# Patient Record
Sex: FEMALE | Race: OTHER | Employment: UNEMPLOYED | URBAN - METROPOLITAN AREA
[De-identification: names, ages, dates, MRNs, and addresses within clinical notes are randomized per-mention and may not be internally consistent; named-entity substitution may affect disease eponyms.]

---

## 2021-03-22 LAB
CHLAMYDIA, EXTERNAL: NEGATIVE
HBSAG, EXTERNAL: NEGATIVE
HIV, EXTERNAL: NEGATIVE
N. GONORRHEA, EXTERNAL: NEGATIVE
RPR, EXTERNAL: NON REACTIVE
RUBELLA, EXTERNAL: NORMAL
TYPE, ABO & RH, EXTERNAL: NORMAL

## 2021-09-17 LAB — GRBS, EXTERNAL: NEGATIVE

## 2021-10-04 ENCOUNTER — HOSPITAL ENCOUNTER (INPATIENT)
Age: 41
LOS: 3 days | Discharge: HOME | DRG: 560 | End: 2021-10-07
Attending: OBSTETRICS & GYNECOLOGY | Admitting: OBSTETRICS & GYNECOLOGY
Payer: MEDICAID

## 2021-10-04 PROBLEM — O24.419 GESTATIONAL DIABETES: Status: ACTIVE | Noted: 2021-10-04

## 2021-10-04 PROBLEM — Z33.1 IUP (INTRAUTERINE PREGNANCY), INCIDENTAL: Status: ACTIVE | Noted: 2021-10-04

## 2021-10-04 PROBLEM — D64.9 ANEMIA: Status: ACTIVE | Noted: 2021-10-04

## 2021-10-04 PROBLEM — O09.529 ADVANCED MATERNAL AGE IN MULTIGRAVIDA: Status: ACTIVE | Noted: 2021-10-04

## 2021-10-04 LAB
ABO + RH BLD: NORMAL
BASOPHILS # BLD: 0 K/UL (ref 0–0.1)
BASOPHILS NFR BLD: 0 % (ref 0–2)
BLOOD GROUP ANTIBODIES SERPL: NORMAL
DIFFERENTIAL METHOD BLD: ABNORMAL
EOSINOPHIL # BLD: 0 K/UL (ref 0–0.4)
EOSINOPHIL NFR BLD: 0 % (ref 0–5)
ERYTHROCYTE [DISTWIDTH] IN BLOOD BY AUTOMATED COUNT: 14.1 % (ref 11.6–14.5)
HCT VFR BLD AUTO: 30.8 % (ref 35–45)
HGB BLD-MCNC: 10.4 G/DL (ref 12–16)
LYMPHOCYTES # BLD: 1.7 K/UL (ref 0.9–3.6)
LYMPHOCYTES NFR BLD: 25 % (ref 21–52)
MCH RBC QN AUTO: 32.5 PG (ref 24–34)
MCHC RBC AUTO-ENTMCNC: 33.8 G/DL (ref 31–37)
MCV RBC AUTO: 96.3 FL (ref 78–100)
MONOCYTES # BLD: 0.5 K/UL (ref 0.05–1.2)
MONOCYTES NFR BLD: 7 % (ref 3–10)
NEUTS SEG # BLD: 4.3 K/UL (ref 1.8–8)
NEUTS SEG NFR BLD: 66 % (ref 40–73)
PLATELET # BLD AUTO: 231 K/UL (ref 135–420)
PMV BLD AUTO: 11.5 FL (ref 9.2–11.8)
RBC # BLD AUTO: 3.2 M/UL (ref 4.2–5.3)
SPECIMEN EXP DATE BLD: NORMAL
WBC # BLD AUTO: 6.5 K/UL (ref 4.6–13.2)

## 2021-10-04 PROCEDURE — 74011250636 HC RX REV CODE- 250/636: Performed by: ADVANCED PRACTICE MIDWIFE

## 2021-10-04 PROCEDURE — 65270000029 HC RM PRIVATE

## 2021-10-04 PROCEDURE — 85025 COMPLETE CBC W/AUTO DIFF WBC: CPT

## 2021-10-04 PROCEDURE — 75410000002 HC LABOR FEE PER 1 HR

## 2021-10-04 PROCEDURE — 86901 BLOOD TYPING SEROLOGIC RH(D): CPT

## 2021-10-04 RX ORDER — SWAB
1 SWAB, NON-MEDICATED MISCELLANEOUS DAILY
COMMUNITY

## 2021-10-04 RX ORDER — TERBUTALINE SULFATE 1 MG/ML
0.25 INJECTION SUBCUTANEOUS
Status: DISCONTINUED | OUTPATIENT
Start: 2021-10-04 | End: 2021-10-06 | Stop reason: HOSPADM

## 2021-10-04 RX ORDER — OXYTOCIN/0.9 % SODIUM CHLORIDE 30/500 ML
0-20 PLASTIC BAG, INJECTION (ML) INTRAVENOUS
Status: DISCONTINUED | OUTPATIENT
Start: 2021-10-04 | End: 2021-10-07 | Stop reason: HOSPADM

## 2021-10-04 RX ORDER — NALBUPHINE HYDROCHLORIDE 10 MG/ML
10 INJECTION, SOLUTION INTRAMUSCULAR; INTRAVENOUS; SUBCUTANEOUS
Status: DISCONTINUED | OUTPATIENT
Start: 2021-10-04 | End: 2021-10-06 | Stop reason: HOSPADM

## 2021-10-04 RX ORDER — MINERAL OIL
30 OIL (ML) ORAL AS NEEDED
Status: COMPLETED | OUTPATIENT
Start: 2021-10-04 | End: 2021-10-06

## 2021-10-04 RX ORDER — SODIUM CHLORIDE, SODIUM LACTATE, POTASSIUM CHLORIDE, CALCIUM CHLORIDE 600; 310; 30; 20 MG/100ML; MG/100ML; MG/100ML; MG/100ML
125 INJECTION, SOLUTION INTRAVENOUS CONTINUOUS
Status: DISCONTINUED | OUTPATIENT
Start: 2021-10-04 | End: 2021-10-06 | Stop reason: HOSPADM

## 2021-10-04 RX ORDER — METHYLERGONOVINE MALEATE 0.2 MG/ML
0.2 INJECTION INTRAVENOUS AS NEEDED
Status: DISCONTINUED | OUTPATIENT
Start: 2021-10-04 | End: 2021-10-06 | Stop reason: HOSPADM

## 2021-10-04 RX ORDER — OXYTOCIN/0.9 % SODIUM CHLORIDE 30/500 ML
87.3 PLASTIC BAG, INJECTION (ML) INTRAVENOUS AS NEEDED
Status: DISCONTINUED | OUTPATIENT
Start: 2021-10-04 | End: 2021-10-06 | Stop reason: HOSPADM

## 2021-10-04 RX ORDER — HYDROMORPHONE HYDROCHLORIDE 1 MG/ML
1 INJECTION, SOLUTION INTRAMUSCULAR; INTRAVENOUS; SUBCUTANEOUS
Status: DISCONTINUED | OUTPATIENT
Start: 2021-10-04 | End: 2021-10-06 | Stop reason: HOSPADM

## 2021-10-04 RX ORDER — LIDOCAINE HYDROCHLORIDE 10 MG/ML
20 INJECTION, SOLUTION EPIDURAL; INFILTRATION; INTRACAUDAL; PERINEURAL AS NEEDED
Status: COMPLETED | OUTPATIENT
Start: 2021-10-04 | End: 2021-10-06

## 2021-10-04 RX ORDER — BUTORPHANOL TARTRATE 2 MG/ML
2 INJECTION INTRAMUSCULAR; INTRAVENOUS
Status: DISCONTINUED | OUTPATIENT
Start: 2021-10-04 | End: 2021-10-06 | Stop reason: HOSPADM

## 2021-10-04 RX ORDER — OXYTOCIN/RINGER'S LACTATE 30/500 ML
10 PLASTIC BAG, INJECTION (ML) INTRAVENOUS AS NEEDED
Status: COMPLETED | OUTPATIENT
Start: 2021-10-04 | End: 2021-10-06

## 2021-10-04 RX ADMIN — Medication 2 MILLI-UNITS/MIN: at 18:30

## 2021-10-04 RX ADMIN — SODIUM CHLORIDE, SODIUM LACTATE, POTASSIUM CHLORIDE, AND CALCIUM CHLORIDE 125 ML/HR: 600; 310; 30; 20 INJECTION, SOLUTION INTRAVENOUS at 18:30

## 2021-10-04 NOTE — H&P
History & Physical    Name: Pancho Cifuentes MRN: [id number]  SSN: [id number]    YOB: 1980  Age: 35 y.o. Sex: female      Subjective:     Estimated Date of Delivery: 10/11/21  OB History    Para Term  AB Living   3 2           SAB TAB Ectopic Molar Multiple Live Births                    # Outcome Date GA Lbr Wali/2nd Weight Sex Delivery Anes PTL Lv   3 Current            2 Para            1 Para                Ms. Finn Arrieta is admitted with pregnancy at 39w0d for induction of labor due to gestational diabetes. Prenatal course was complicated by GDM-diet controlled, Anemia, and low lying placenta (resolved. Please see prenatal records for details. Past Medical History:   Diagnosis Date    Anemia 10/4/2021    Gestational diabetes      No past surgical history on file. Social History     Occupational History    Not on file   Tobacco Use    Smoking status: Never Smoker    Smokeless tobacco: Never Used   Substance and Sexual Activity    Alcohol use: Not on file    Drug use: Not on file    Sexual activity: Not on file     No family history on file. No Known Allergies  Prior to Admission medications    Medication Sig Start Date End Date Taking? Authorizing Provider   prenatal vit-iron fumarate-fa (PRENATAL PLUS with IRON) 28 mg iron- 800 mcg tab Take 1 Tablet by mouth daily. Yes Provider, Historical        Review of Systems: A comprehensive review of systems was negative. Objective:     Vitals:  Vitals:    10/04/21 1727 10/04/21 1731 10/04/21 1732 10/04/21 173   BP:  (!) 116/57     Pulse:  88     Resp:       Temp:       SpO2: 100%  99% 99%   Weight:       Height:            Physical Exam:  Patient without distress.   Lung: normal respiratory effort  Abdomen: soft, nontender  Fundus: soft and non tender  Perineum: blood absent, amniotic fluid absent  Cervical Exam: 2/50/-2  Membranes:  Intact  Fetal Heart Rate: Reactive          Prenatal Labs:   Lab Results   Component Value Date/Time    Rubella, External immune 2021 12:00 AM    HBsAg, External negative 2021 12:00 AM    HIV, External negative 2021 12:00 AM    RPR, External non reactive 2021 12:00 AM    Gonorrhea, External negative 2021 12:00 AM    Chlamydia, External negative 2021 12:00 AM    ABO,Rh O positive 2021 12:00 AM    GrBStrep, External negative 2021 12:00 AM       Impression/Plan:     Active Problems:    IUP (intrauterine pregnancy), incidental (10/4/2021)      Anemia (10/4/2021)      Advanced maternal age in multigravida (10/4/2021)      Gestational diabetes (10/4/2021)         Plan:   Admit for induction of labor  Place IV/Routine labs  Reassuring fetal status  Continuous EFM  GBS Negative  Unable to place cook-start pitocin for IOL  Anticipate   Dr. Bree Tristan aware of patient admission      Nu Huang CNM

## 2021-10-04 NOTE — PROGRESS NOTES
34 , 39 weeks pregnant here for induction of labor for GDM, possible LGA. [address] attempted [facility] balloon placement with speculum and without speculum. Unable to place due to soft cervix. Plan of care is to induce with Pitocin. 191 Bedside and Verbal shift change report given to Anastasia Tang (oncoming nurse) by Simba Palomares RN  (offgoing nurse). Report included the following information SBAR, Kardex, Procedure Summary, Intake/Output, MAR, Recent Results and Med Rec Status.

## 2021-10-05 PROBLEM — Z34.90 ENCOUNTER FOR INDUCTION OF LABOR: Status: ACTIVE | Noted: 2021-10-05

## 2021-10-05 LAB — GLUCOSE BLD STRIP.AUTO-MCNC: 71 MG/DL (ref 70–110)

## 2021-10-05 PROCEDURE — 74011250636 HC RX REV CODE- 250/636: Performed by: ADVANCED PRACTICE MIDWIFE

## 2021-10-05 PROCEDURE — 65270000029 HC RM PRIVATE

## 2021-10-05 PROCEDURE — 75410000002 HC LABOR FEE PER 1 HR

## 2021-10-05 PROCEDURE — 82962 GLUCOSE BLOOD TEST: CPT

## 2021-10-05 PROCEDURE — 74011000250 HC RX REV CODE- 250: Performed by: ADVANCED PRACTICE MIDWIFE

## 2021-10-05 RX ORDER — MISOPROSTOL 100 UG/1
50 TABLET ORAL ONCE
Status: COMPLETED | OUTPATIENT
Start: 2021-10-06 | End: 2021-10-06

## 2021-10-05 RX ORDER — MISOPROSTOL 100 UG/1
50 TABLET ORAL ONCE
Status: COMPLETED | OUTPATIENT
Start: 2021-10-05 | End: 2021-10-05

## 2021-10-05 RX ADMIN — SODIUM CHLORIDE, SODIUM LACTATE, POTASSIUM CHLORIDE, AND CALCIUM CHLORIDE 125 ML/HR: 600; 310; 30; 20 INJECTION, SOLUTION INTRAVENOUS at 17:31

## 2021-10-05 RX ADMIN — SODIUM CHLORIDE, SODIUM LACTATE, POTASSIUM CHLORIDE, AND CALCIUM CHLORIDE 125 ML/HR: 600; 310; 30; 20 INJECTION, SOLUTION INTRAVENOUS at 01:48

## 2021-10-05 RX ADMIN — BUTORPHANOL TARTRATE 2 MG: 2 INJECTION, SOLUTION INTRAMUSCULAR; INTRAVENOUS at 19:58

## 2021-10-05 RX ADMIN — Medication 2 MILLI-UNITS/MIN: at 11:30

## 2021-10-05 RX ADMIN — MISOPROSTOL 50 MCG: 100 TABLET ORAL at 21:28

## 2021-10-05 RX ADMIN — SODIUM CHLORIDE, SODIUM LACTATE, POTASSIUM CHLORIDE, AND CALCIUM CHLORIDE 125 ML/HR: 600; 310; 30; 20 INJECTION, SOLUTION INTRAVENOUS at 09:34

## 2021-10-05 NOTE — PROGRESS NOTES
Problem: Vaginal Delivery: Day of Deliver-Laboring  Goal: Activity/Safety  Outcome: Progressing Towards Goal  Goal: Diagnostic Test/Procedures  Outcome: Progressing Towards Goal  Goal: Medications  Outcome: Progressing Towards Goal  Goal: Respiratory  Outcome: Progressing Towards Goal  Goal: Treatments/Interventions/Procedures  Outcome: Progressing Towards Goal  Goal: *Vital signs within defined limits  Outcome: Progressing Towards Goal  Goal: *Labs within defined limits  Outcome: Progressing Towards Goal  Goal: *Hemodynamically stable  Outcome: Progressing Towards Goal  Goal: *Optimal pain control at patient's stated goal  Outcome: Progressing Towards Goal

## 2021-10-05 NOTE — PROGRESS NOTES
Problem: Patient Education: Go to Patient Education Activity  Goal: Patient/Family Education  10/5/2021 0727 by Thomas Wright RN  Outcome: Progressing Towards Goal  10/5/2021 0726 by Thomas Wright RN  Outcome: Progressing Towards Goal     Problem: Vaginal Delivery: Day of Deliver-Laboring  Goal: Activity/Safety  10/5/2021 0727 by Thomas Wright, RN  Outcome: Progressing Towards Goal  10/5/2021 0726 by Thomas Wright RN  Outcome: Progressing Towards Goal  Goal: Consults, if ordered  10/5/2021 0727 by Thomas Wright RN  Outcome: Progressing Towards Goal  10/5/2021 0726 by Thomas Wright RN  Outcome: Progressing Towards Goal  Goal: Diagnostic Test/Procedures  Outcome: Progressing Towards Goal  Goal: Nutrition/Diet  10/5/2021 0727 by Thoams Wright RN  Outcome: Progressing Towards Goal  10/5/2021 0726 by Thomas Wright RN  Outcome: Progressing Towards Goal  Goal: Discharge Planning  10/5/2021 0727 by Thomas Wright RN  Outcome: Progressing Towards Goal  10/5/2021 0726 by Thomas Wright RN  Outcome: Progressing Towards Goal  Goal: Medications  10/5/2021 0727 by Thomas Wright RN  Outcome: Progressing Towards Goal  10/5/2021 0726 by Thomas Wright RN  Outcome: Progressing Towards Goal  Goal: Respiratory  10/5/2021 0727 by Thomas Wright RN  Outcome: Progressing Towards Goal  10/5/2021 0726 by Thomas Wright RN  Outcome: Progressing Towards Goal  Goal: Treatments/Interventions/Procedures  10/5/2021 0727 by Thomas Wright RN  Outcome: Progressing Towards Goal  10/5/2021 0726 by Thomas Wright RN  Outcome: Progressing Towards Goal  Goal: *Vital signs within defined limits  10/5/2021 0727 by Thomas Wright RN  Outcome: Progressing Towards Goal  10/5/2021 0726 by Thomas Wright RN  Outcome: Progressing Towards Goal  Goal: *Labs within defined limits  10/5/2021 0727 by Thomas Wright RN  Outcome: Progressing Towards Goal  10/5/2021 0726 by Thomas Wright RN  Outcome: Progressing Towards Goal  Goal: *Hemodynamically stable  10/5/2021 0727 by Piedad Murphy RN  Outcome: Progressing Towards Goal  10/5/2021 0726 by Piedad Murphy RN  Outcome: Progressing Towards Goal  Goal: *Optimal pain control at patient's stated goal  10/5/2021 0727 by Piedad Murphy RN  Outcome: Progressing Towards Goal  10/5/2021 0726 by Piedad Murphy RN  Outcome: Progressing Towards Goal     Problem: Pain  Goal: *Control of Pain  Outcome: Progressing Towards Goal

## 2021-10-05 NOTE — PROGRESS NOTES
OB Labor Note    Assessment:   IUP  At 39+0 wga, IOL for AGDM, on pitocin   Cat I fetal tracing   Low risk PPH   Comfortable at this time    Plan: Titrate pitocin for adequate ctx pattern - AROM when safe/if indicated next exam   Continue to monitor labor   Anticipate        Subjective:   Pt. does not have any complaints. Pt. is feeling contractions. Pt. is feeling fetal movement. Objective:    V/e: unchanged from admission except feel station -3    Cat I fetal tracing - baseline rate 135, acclerations, no declerations, moderate variability    Ctx: every 2- 3 minutes, mild to palpation     Visit Vitals  /63   Pulse 81   Temp 98.4 °F (36.9 °C)   Resp 16   Ht 5' 3\" (1.6 m)   Wt 72.6 kg (160 lb)   SpO2 99%   Breastfeeding Yes   BMI 28.34 kg/m²      Cervical Exam  Dilation (cm): 2  Eff: 50 %  Station: -2  Vaginal exam done by? : Syed Leach     Patient Vitals for the past 4 hrs:    Mode Fetal Heart Rate Variability Decelerations Accelerations RN Reviewed Strip?   10/04/21 2100 External 140 6-25 BPM None Yes Yes   10/04/21 2045 -- -- -- -- -- Yes          10/4/2021 11:41 PM

## 2021-10-05 NOTE — PROGRESS NOTES
6848 Care relinquished to Clovis Taylor RN at this time. 1756 Bedside shift change report given to MATTY Peterson (oncoming nurse) by Gray Mueller RN (offgoing nurse). Report included the following information SBAR, Kardex, Intake/Output, MAR and Recent Results.

## 2021-10-05 NOTE — PROGRESS NOTES
9938- Bedside and Verbal shift change report given to M . Bettejane Bosworth (oncoming nurse) by Yoly Wei, NICK (offgoing nurse). Report included the following information SBAR, Kardex, Intake/Output, MAR and Recent Results. 3347- Pt turned right lateral with PNB.     0744- Pt turned left lateral with PNB.     0806- Pt turned right lateral with PNB.     0815- Pt turned left lateral with PNB.     0933- Pt turned right lateral with PNB. 1000- Pt up to restroom to void. 1008- M. Maribeth Neighbors at bedside. SVE 2-3/80/-2.     1010- Orders received from Ashley Burnette CNM to stop pitocin at this time, allow pt to shower and eat a meal, then restart pitocin after eating and shoiwer. Interpretor Shey Moffett #[id number] used to discuss plan of care with pt and significant other. Pt and significant other in agreement with plan of care. All questions addressed at this time. 1017- EFM removed. IV wrapped. Pt up to shower. 1115- EFM monitored applied. Abdomen soft to palpation and non-tender. 1126- Pt turned left lateral with PNB.     1229- Pt turned right lateral with PNB.     1305- Pt turned left lateral with right leg in stirrup.     1322- Pt turned right lateral with left leg in stirrup.     1352- Pt turned left lateral with right leg in stirrup.     1402- Pt up to restroom to void. 1408- Pt turned left lateral with PNB. 1502- Pt turned right lateral with PNB. 1552- Pt up to restroom to void. 1612- Pt turned right lateral with PNB.     1626- Pt turned left lateral with PNB.     1646- Pt placed in hands and knees. 1655- Pt turned left lateral with left leg in stirrup.     1707- Pt turned right lateral with left leg in stirrup. 1733- Pt turned left lateral with right leg in stirrup.     1822- Pt turned right lateral with left leg in stirrup. 1910- Bedside and Verbal shift change report given to SCHUYLER Hernandez (oncoming nurse) by Amita Garcia RN (offgoing nurse).  Report included the following information SBAR, Kardex, Intake/Output, MAR and Recent Results. Opportunities for questions was provided.

## 2021-10-06 PROCEDURE — 65270000029 HC RM PRIVATE

## 2021-10-06 PROCEDURE — 74011250637 HC RX REV CODE- 250/637: Performed by: ADVANCED PRACTICE MIDWIFE

## 2021-10-06 PROCEDURE — 74011250637 HC RX REV CODE- 250/637

## 2021-10-06 PROCEDURE — 75410000000 HC DELIVERY VAGINAL/SINGLE

## 2021-10-06 PROCEDURE — 74011250636 HC RX REV CODE- 250/636: Performed by: ADVANCED PRACTICE MIDWIFE

## 2021-10-06 PROCEDURE — 74011000250 HC RX REV CODE- 250: Performed by: ADVANCED PRACTICE MIDWIFE

## 2021-10-06 PROCEDURE — 75410000002 HC LABOR FEE PER 1 HR

## 2021-10-06 PROCEDURE — 75410000003 HC RECOV DEL/VAG/CSECN EA 0.5 HR

## 2021-10-06 RX ORDER — OXYCODONE AND ACETAMINOPHEN 5; 325 MG/1; MG/1
2 TABLET ORAL
Status: DISCONTINUED | OUTPATIENT
Start: 2021-10-06 | End: 2021-10-07 | Stop reason: HOSPADM

## 2021-10-06 RX ORDER — AMOXICILLIN 250 MG
1 CAPSULE ORAL
Status: DISCONTINUED | OUTPATIENT
Start: 2021-10-06 | End: 2021-10-07 | Stop reason: HOSPADM

## 2021-10-06 RX ORDER — ZOLPIDEM TARTRATE 5 MG/1
5 TABLET ORAL
Status: DISCONTINUED | OUTPATIENT
Start: 2021-10-06 | End: 2021-10-07 | Stop reason: HOSPADM

## 2021-10-06 RX ORDER — ACETAMINOPHEN 325 MG/1
650 TABLET ORAL
Status: DISCONTINUED | OUTPATIENT
Start: 2021-10-06 | End: 2021-10-07 | Stop reason: HOSPADM

## 2021-10-06 RX ORDER — PROMETHAZINE HYDROCHLORIDE 25 MG/ML
25 INJECTION, SOLUTION INTRAMUSCULAR; INTRAVENOUS
Status: DISCONTINUED | OUTPATIENT
Start: 2021-10-06 | End: 2021-10-07 | Stop reason: HOSPADM

## 2021-10-06 RX ORDER — MINERAL OIL
OIL (ML) ORAL
Status: COMPLETED
Start: 2021-10-06 | End: 2021-10-06

## 2021-10-06 RX ORDER — MISOPROSTOL 100 UG/1
50 TABLET ORAL ONCE
Status: COMPLETED | OUTPATIENT
Start: 2021-10-06 | End: 2021-10-06

## 2021-10-06 RX ORDER — IBUPROFEN 400 MG/1
800 TABLET ORAL
Status: DISCONTINUED | OUTPATIENT
Start: 2021-10-06 | End: 2021-10-07 | Stop reason: HOSPADM

## 2021-10-06 RX ADMIN — Medication 10000 MILLI-UNITS: at 14:27

## 2021-10-06 RX ADMIN — Medication 30 ML: at 14:28

## 2021-10-06 RX ADMIN — BUTORPHANOL TARTRATE 2 MG: 2 INJECTION, SOLUTION INTRAMUSCULAR; INTRAVENOUS at 10:04

## 2021-10-06 RX ADMIN — MINERAL OIL 30 ML: 1000 SOLUTION ORAL at 14:28

## 2021-10-06 RX ADMIN — SODIUM CHLORIDE, SODIUM LACTATE, POTASSIUM CHLORIDE, AND CALCIUM CHLORIDE 125 ML/HR: 600; 310; 30; 20 INJECTION, SOLUTION INTRAVENOUS at 10:23

## 2021-10-06 RX ADMIN — MISOPROSTOL 50 MCG: 100 TABLET ORAL at 06:39

## 2021-10-06 RX ADMIN — LIDOCAINE HYDROCHLORIDE 20 ML: 10 INJECTION, SOLUTION EPIDURAL; INFILTRATION; INTRACAUDAL; PERINEURAL at 14:20

## 2021-10-06 RX ADMIN — BUTORPHANOL TARTRATE 2 MG: 2 INJECTION, SOLUTION INTRAMUSCULAR; INTRAVENOUS at 13:58

## 2021-10-06 RX ADMIN — IBUPROFEN 800 MG: 400 TABLET, FILM COATED ORAL at 16:20

## 2021-10-06 RX ADMIN — SODIUM CHLORIDE, SODIUM LACTATE, POTASSIUM CHLORIDE, AND CALCIUM CHLORIDE 125 ML/HR: 600; 310; 30; 20 INJECTION, SOLUTION INTRAVENOUS at 01:49

## 2021-10-06 RX ADMIN — MISOPROSTOL 50 MCG: 100 TABLET ORAL at 02:29

## 2021-10-06 NOTE — L&D DELIVERY NOTE
Delivery Note    Obstetrician:  Jewels Truong CNM    Assistant: none    Pre-Delivery Diagnosis: Term pregnancy    Post-Delivery Diagnosis: Living  infant(s) and Female    Intrapartum Event: None    Procedure: Spontaneous vaginal delivery    Epidural: NO    Monitor:  Fetal Heart Tones - External and Uterine Contractions - External    Indications for instrumental delivery: none    Estimated Blood Loss: 300    Episiotomy: none    Laceration(s):  2nd degree    Laceration(s) repair: YES    Presentation: Cephalic    Fetal Description: [place]    Fetal Position: Occiput Anterior    Birth Weight: 7lbs 2oz    Birth Length: not yet assessed    Apgar - One Minute: 8    Apgar - Five Minutes: 9    Umbilical Cord: 3 vessels present  Specimens: placenta delivered intact  Complications:  none           Cord Blood Results:   Information for the patient's :  Soha Rachelo [[id number]]   No results found for: PCTABR, PCTDIG, BILI, ABORH     Prenatal Labs:     Lab Results   Component Value Date/Time    ABO/Rh(D) O POSITIVE 10/04/2021 05:45 PM    HBsAg, External negative 2021 12:00 AM    HIV, External negative 2021 12:00 AM    Rubella, External immune 2021 12:00 AM    RPR, External non reactive 2021 12:00 AM    Gonorrhea, External negative 2021 12:00 AM    Chlamydia, External negative 2021 12:00 AM    GrBStrep, External negative 2021 12:00 AM        Attending Attestation: I was present and scrubbed for the entire procedure

## 2021-10-06 NOTE — PROGRESS NOTES
Problem: Patient Education: Go to Patient Education Activity  Goal: Patient/Family Education  Outcome: Progressing Towards Goal     Problem: Vaginal Delivery: Day of Delivery-Post delivery  Goal: Activity/Safety  Outcome: Progressing Towards Goal  Goal: Consults, if ordered  Outcome: Progressing Towards Goal  Goal: Nutrition/Diet  Outcome: Progressing Towards Goal  Goal: Discharge Planning  Outcome: Progressing Towards Goal  Goal: Medications  Outcome: Progressing Towards Goal  Goal: Treatments/Interventions/Procedures  Outcome: Progressing Towards Goal  Goal: *Vital signs within defined limits  Outcome: Progressing Towards Goal  Goal: *Labs within defined limits  Outcome: Progressing Towards Goal  Goal: *Hemodynamically stable  Outcome: Progressing Towards Goal  Goal: *Optimal pain control at patient's stated goal  Outcome: Progressing Towards Goal  Goal: *Participates in infant care  Outcome: Progressing Towards Goal  Goal: *Demonstrates progressive activity  Outcome: Progressing Towards Goal  Goal: *Tolerating diet  Outcome: Progressing Towards Goal     Problem: Vaginal Delivery: Postpartum Day 1  Goal: Activity/Safety  Outcome: Progressing Towards Goal  Goal: Consults, if ordered  Outcome: Progressing Towards Goal  Goal: Diagnostic Test/Procedures  Outcome: Progressing Towards Goal  Goal: Nutrition/Diet  Outcome: Progressing Towards Goal  Goal: Discharge Planning  Outcome: Progressing Towards Goal  Goal: Medications  Outcome: Progressing Towards Goal  Goal: Treatments/Interventions/Procedures  Outcome: Progressing Towards Goal  Goal: Psychosocial  Outcome: Progressing Towards Goal  Goal: *Vital signs within defined limits  Outcome: Progressing Towards Goal  Goal: *Labs within defined limits  Outcome: Progressing Towards Goal  Goal: *Hemodynamically stable  Outcome: Progressing Towards Goal  Goal: *Optimal pain control at patient's stated goal  Outcome: Progressing Towards Goal  Goal: *Participates in infant care  Outcome: Progressing Towards Goal  Goal: *Demonstrates progressive activity  Outcome: Progressing Towards Goal  Goal: *Performs self perineal care  Outcome: Progressing Towards Goal  Goal: *Appropriate parent-infant bonding  Outcome: Progressing Towards Goal  Goal: *Tolerating diet  Outcome: Progressing Towards Goal  Goal: *Performs self breast care  Outcome: Progressing Towards Goal     Problem: Pain  Goal: *Control of Pain  Outcome: Progressing Towards Goal

## 2021-10-06 NOTE — PROGRESS NOTES
Problem: Patient Education: Go to Patient Education Activity  Goal: Patient/Family Education  Outcome: Progressing Towards Goal     Problem: Vaginal Delivery: Day of Deliver-Laboring  Goal: Off Pathway (Use only if patient is Off Pathway)  Outcome: Progressing Towards Goal  Goal: Activity/Safety  Outcome: Progressing Towards Goal  Goal: Consults, if ordered  Outcome: Progressing Towards Goal  Goal: Diagnostic Test/Procedures  Outcome: Progressing Towards Goal  Goal: Nutrition/Diet  Outcome: Progressing Towards Goal  Goal: Discharge Planning  Outcome: Progressing Towards Goal  Goal: Medications  Outcome: Progressing Towards Goal  Goal: Respiratory  Outcome: Progressing Towards Goal  Goal: Treatments/Interventions/Procedures  Outcome: Progressing Towards Goal  Goal: *Vital signs within defined limits  Outcome: Progressing Towards Goal  Goal: *Labs within defined limits  Outcome: Progressing Towards Goal  Goal: *Hemodynamically stable  Outcome: Progressing Towards Goal  Goal: *Optimal pain control at patient's stated goal  Outcome: Progressing Towards Goal     Problem: Vaginal Delivery: Day of Delivery-Post delivery  Goal: Off Pathway (Use only if patient is Off Pathway)  Outcome: Progressing Towards Goal  Goal: Activity/Safety  Outcome: Progressing Towards Goal  Goal: Consults, if ordered  Outcome: Progressing Towards Goal  Goal: Nutrition/Diet  Outcome: Progressing Towards Goal  Goal: Discharge Planning  Outcome: Progressing Towards Goal  Goal: Medications  Outcome: Progressing Towards Goal  Goal: Treatments/Interventions/Procedures  Outcome: Progressing Towards Goal  Goal: *Vital signs within defined limits  Outcome: Progressing Towards Goal  Goal: *Labs within defined limits  Outcome: Progressing Towards Goal  Goal: *Hemodynamically stable  Outcome: Progressing Towards Goal  Goal: *Optimal pain control at patient's stated goal  Outcome: Progressing Towards Goal  Goal: *Participates in infant care  Outcome: Progressing Towards Goal  Goal: *Demonstrates progressive activity  Outcome: Progressing Towards Goal  Goal: *Tolerating diet  Outcome: Progressing Towards Goal     Problem: Vaginal Delivery: Postpartum Day 1  Goal: Off Pathway (Use only if patient is Off Pathway)  Outcome: Progressing Towards Goal  Goal: Activity/Safety  Outcome: Progressing Towards Goal  Goal: Consults, if ordered  Outcome: Progressing Towards Goal  Goal: Diagnostic Test/Procedures  Outcome: Progressing Towards Goal  Goal: Nutrition/Diet  Outcome: Progressing Towards Goal  Goal: Discharge Planning  Outcome: Progressing Towards Goal  Goal: Medications  Outcome: Progressing Towards Goal  Goal: Treatments/Interventions/Procedures  Outcome: Progressing Towards Goal  Goal: Psychosocial  Outcome: Progressing Towards Goal  Goal: *Vital signs within defined limits  Outcome: Progressing Towards Goal  Goal: *Labs within defined limits  Outcome: Progressing Towards Goal  Goal: *Hemodynamically stable  Outcome: Progressing Towards Goal  Goal: *Optimal pain control at patient's stated goal  Outcome: Progressing Towards Goal  Goal: *Participates in infant care  Outcome: Progressing Towards Goal  Goal: *Demonstrates progressive activity  Outcome: Progressing Towards Goal  Goal: *Performs self perineal care  Outcome: Progressing Towards Goal  Goal: *Appropriate parent-infant bonding  Outcome: Progressing Towards Goal  Goal: *Tolerating diet  Outcome: Progressing Towards Goal  Goal: *Performs self breast care  Outcome: Progressing Towards Goal     Problem: Vaginal Delivery: Postpartum 2  Goal: Off Pathway (Use only if patient is Off Pathway)  Outcome: Progressing Towards Goal  Goal: Activity/Safety  Outcome: Progressing Towards Goal  Goal: Consults, if ordered  Outcome: Progressing Towards Goal  Goal: Nutrition/Diet  Outcome: Progressing Towards Goal  Goal: Discharge Planning  Outcome: Progressing Towards Goal  Goal: Medications  Outcome: Progressing Towards Goal  Goal: Treatments/Interventions/Procedures  Outcome: Progressing Towards Goal  Goal: Psychosocial  Outcome: Progressing Towards Goal     Problem: Vaginal Delivery: Discharge Outcomes  Goal: *Verbalizes name, dosage, time, side effects, and number of days to continue medications  Outcome: Progressing Towards Goal  Goal: *Describes available resources and support systems  Outcome: Progressing Towards Goal  Goal: *No signs and symptoms of infection  Outcome: Progressing Towards Goal  Goal: *Birth certificate information completed  Outcome: Progressing Towards Goal  Goal: *Received and verbalizes understanding of discharge plan and instructions  Outcome: Progressing Towards Goal  Goal: *Vital signs within defined limits  Outcome: Progressing Towards Goal  Goal: *Labs within defined limits  Outcome: Progressing Towards Goal  Goal: *Hemodynamically stable  Outcome: Progressing Towards Goal  Goal: *Optimal pain control at patient's stated goal  Outcome: Progressing Towards Goal  Goal: *Participates in infant care  Outcome: Progressing Towards Goal  Goal: *Demonstrates progressive activity  Outcome: Progressing Towards Goal  Goal: *Appropriate parent-infant bonding  Outcome: Progressing Towards Goal  Goal: *Tolerating diet  Outcome: Progressing Towards Goal     Problem: Pain  Goal: *Control of Pain  Outcome: Progressing Towards Goal

## 2021-10-06 NOTE — PROGRESS NOTES
Labor Progress Note  Patient seen, fetal heart rate and contraction pattern evaluated, patient examined. No data found. Physical Exam:  Cervical Exam:  4 cm dilated    70% effaced    -1 station    Presenting Part: cephalic  Cervical Position: mid position  Membranes:  Artificial Rupture of Membranes;  Amniotic Fluid: small amount of clear fluid  Uterine Activity: Frequency: Every 2-4 minutes, Duration: 70 seconds and Intensity: moderate  Fetal Heart Rate: Baseline: 140 per minute  Variability: moderate  Accelerations: yes  Decelerations: none  Uterine contractions: regular, every 2-4 minutes    Assessment/Plan:  Reassuring fetal status, Labor  Progressing normally, Continue plan for vaginal delivery

## 2021-10-06 NOTE — PROGRESS NOTES
1910 Bedside and Verbal shift change report given to SCHUYLER Aburto (oncoming nurse) by Nathalie Manrique RN (offgoing nurse). Report included the following information SBAR, Kardex, Intake/Output, MAR and Recent Results. [address] Lord CNM to bedside. EFM tracing reviewed. Pt requests Stadol prior to cervical exam.    1910-0705 [facility] 0710 Bedside and Verbal shift change report given and care relinquished to Ely Schwab RN (oncoming nurse) by SCHUYLER Aburto (offgoing nurse). Report included the following information SBAR, Kardex, Intake/Output, MAR and Recent Results.

## 2021-10-06 NOTE — PROGRESS NOTES
TRANSFER - IN REPORT:    Verbal report received from LORENZA Bey RN (name) on Zelda Hidalgo  being received from [facility] (unit) for routine progression of care      Report consisted of patients Situation, Background, Assessment and   Recommendations(SBAR). Information from the following report(s) SBAR, Intake/Output, MAR and Recent Results was reviewed with the receiving nurse. Opportunity for questions and clarification was provided. 1640-Assessment completed upon patients arrival to unit and care assumed. VSS. Oriente to room and unit. Advised to call for assistance to bathroom. 1745-assisted with bottle feeding infant. 1845-resting in bed. Denies needs. 1925-Bedside and Verbal shift change report given to JAD Ocampo RN (oncoming nurse) by HUSSAIN Santacruz LPN (offgoing nurse). Report given with SBAR, Kardex, Intake/Output, MAR and Recent Results.

## 2021-10-06 NOTE — PROGRESS NOTES
0710 Bedside and Verbal shift change report given to [address] [place] [address] Dr RN  (oncoming nurse) by Suzanne Dawn RN (offgoing nurse). Report included the following information SBAR, Kardex, Procedure Summary, Intake/Output, MAR, Recent Results and Med Rec Status. [address] [employer] services used to explain and answer questions about rupturing pt membranes. Pt was AROMed by Therman Free CNM clear, blood tinged fluid, 3cm/70/-2. Pre-medicated pt with Stadol due to her inability to tolerate vaginal exams. 1022 Pitocin restarted and explained to patient. Pain management options discussed. Pt does not desire epidural.    1419  of viable girl. 1630 TRANSFER - OUT REPORT:    Verbal report given to JACOB Santacruz (name) on [facility]  being transferred to [facility] (unit) for routine progression of care       Report consisted of patients Situation, Background, Assessment and   Recommendations(SBAR). Information from the following report(s) SBAR, Kardex, Intake/Output, MAR, Recent Results and Med Rec Status was reviewed with the receiving nurse. Lines:   Peripheral IV 10/04/21 Distal;Posterior;Right Forearm (Active)   Site Assessment Clean, dry, & intact 10/06/21 1622   Phlebitis Assessment 0 10/06/21 1622   Infiltration Assessment 0 10/06/21 1622   Dressing Status Clean, dry, & intact 10/06/21 1622   Dressing Type Tape;Transparent 10/06/21 0754   Hub Color/Line Status Pink 10/06/21 0754   Action Taken Open ports on tubing capped; Wrapped 10/05/21 1017   Alcohol Cap Used Yes 10/06/21 1622        Opportunity for questions and clarification was provided.       Patient transported with:   Registered Nurse

## 2021-10-07 VITALS
BODY MASS INDEX: 28.35 KG/M2 | WEIGHT: 160 LBS | RESPIRATION RATE: 16 BRPM | TEMPERATURE: 97.8 F | HEIGHT: 63 IN | SYSTOLIC BLOOD PRESSURE: 103 MMHG | OXYGEN SATURATION: 100 % | DIASTOLIC BLOOD PRESSURE: 69 MMHG | HEART RATE: 65 BPM

## 2021-10-07 LAB
HCT VFR BLD AUTO: 28.6 % (ref 35–45)
HGB BLD-MCNC: 9.4 G/DL (ref 12–16)

## 2021-10-07 PROCEDURE — 85014 HEMATOCRIT: CPT

## 2021-10-07 PROCEDURE — 36415 COLL VENOUS BLD VENIPUNCTURE: CPT

## 2021-10-07 PROCEDURE — 74011250637 HC RX REV CODE- 250/637: Performed by: ADVANCED PRACTICE MIDWIFE

## 2021-10-07 RX ORDER — IBUPROFEN 800 MG/1
800 TABLET ORAL
Qty: 60 TABLET | Refills: 1 | Status: SHIPPED | OUTPATIENT
Start: 2021-10-07

## 2021-10-07 RX ADMIN — IBUPROFEN 800 MG: 400 TABLET, FILM COATED ORAL at 12:45

## 2021-10-07 RX ADMIN — IBUPROFEN 800 MG: 400 TABLET, FILM COATED ORAL at 02:39

## 2021-10-07 RX ADMIN — DOCUSATE SODIUM 50 MG AND SENNOSIDES 8.6 MG 1 TABLET: 8.6; 5 TABLET, FILM COATED ORAL at 06:36

## 2021-10-07 NOTE — PROGRESS NOTES
Progress Note    Patient: Ollie Samson MRN: [id number]  SSN: [id number]    YOB: 1980  Age: 35 y.o. Sex: female      Subjective:     Postpartum Day: 1 Vaginal Delivery    The patient is without complaints. The patient is ambulating well. The patient is tolerating a normal diet. The baby is well. She is breastfeeding. She would like early discharge home today. Objective:      Patient Vitals for the past 8 hrs:   BP Temp Pulse Resp SpO2   10/07/21 0810 103/69 97.8 °F (36.6 °C) 65 16 100 %     LABS: Recent Results (from the past 24 hour(s))   HGB & HCT    Collection Time: 10/07/21  1:30 AM   Result Value Ref Range    HGB 9.4 (L) 12.0 - 16.0 g/dL    HCT 28.6 (L) 35.0 - 45.0 %       Lab Results   Component Value Date/Time    HGB 9.4 (L) 10/07/2021 01:30 AM     Lab Results   Component Value Date/Time    HCT 28.6 (L) 10/07/2021 01:30 AM      Lochia:  appropriate   Uterine Fundus:   firm, 0     Lab/Data Review: All lab results for the last 24 hours reviewed. Assessment:     Status post: Doing well postpartum vaginal delivery day 1. Plan:     Continue day 1 post-vaginal delivery orders. Postpartum care discussed including diet, ambulation, and actvitiy restrictions. Plan for discharge home this afternoon.     Signed By: Ofelia Espinal CNM     October 7, 2021

## 2021-10-07 NOTE — DISCHARGE SUMMARY
Obstetrical Discharge Summary     Name: Ollie Samson MRN: [id number]  SSN: [id number]    YOB: 1980  Age: 35 y.o. Sex: female      Admit Date: 10/4/2021    Discharge Date: 10/7/2021     Admitting Physician: Xena Smith MD     Attending Physician:  Sean Masters MD     Admission Diagnoses: Encounter for induction of labor [Z34.90]    Discharge Diagnoses:   Information for the patient's :  [place] [[id number]]   Delivery of a 3.24 kg female infant via Vaginal, Spontaneous on 10/6/2021 at 2:19 PM  by Bozena Garcia. Apgars were 8  and 9 . Additional Diagnoses:   Hospital Problems  Date Reviewed: 10/4/2021        Codes Class Noted POA    Encounter for induction of labor ICD-10-CM: Z34.90  ICD-9-CM: V22.1  10/5/2021 Unknown        IUP (intrauterine pregnancy), incidental ICD-10-CM: Z33.1  ICD-9-CM: V22.2  10/4/2021 Yes        Anemia ICD-10-CM: D64.9  ICD-9-CM: 285.9  10/4/2021 Yes        Advanced maternal age in multigravida ICD-10-CM: O09.529  ICD-9-CM: 659.60  10/4/2021 Yes        Gestational diabetes ICD-10-CM: O24.419  ICD-9-CM: 648.80  10/4/2021 Yes             Lab Results   Component Value Date/Time    Rubella, External immune 2021 12:00 AM    GrBStrep, External negative 2021 12:00 AM       Immunization(s): There is no immunization history for the selected administration types on file for this patient. Hospital Course: Normal hospital course following the delivery. Patient Instructions:   Current Discharge Medication List      START taking these medications    Details   ibuprofen (MOTRIN) 800 mg tablet Take 1 Tablet by mouth every eight (8) hours as needed (Pain scale 4-6). Qty: 60 Tablet, Refills: 1         CONTINUE these medications which have NOT CHANGED    Details   prenatal vit-iron fumarate-fa (PRENATAL PLUS with IRON) 28 mg iron- 800 mcg tab Take 1 Tablet by mouth daily. Reference my discharge instructions.     Follow-up Appointments   Procedures    FOLLOW UP VISIT Appointment in: 6 Weeks     Standing Status:   Standing     Number of Occurrences:   1     Order Specific Question:   Appointment in     Answer:   6 Weeks        Signed By:  Ofelia Espinal CNM     October 7, 2021

## 2021-10-07 NOTE — DISCHARGE INSTRUCTIONS
POST DELIVERY DISCHARGE INSTRUCTIONS    Name: Tomas Cruz  YOB: 1980  Primary Diagnosis: Active Problems:    IUP (intrauterine pregnancy), incidental (10/4/2021)      Anemia (10/4/2021)      Advanced maternal age in multigravida (10/4/2021)      Gestational diabetes (10/4/2021)      Encounter for induction of labor (10/5/2021)        General:     Diet/Diet Restrictions:  Eight 8-ounce glasses of fluid daily (water, juices); avoid excessive caffeine intake. Meals/snacks as desired which are high in fiber and carbohydrates and low in fat and cholesterol. Physical Activity / Restrictions / Safety:     Avoid heavy lifting, no more that 8 lbs. For 2-3 weeks; limit use of stairs to 2 times daily for the first week home. No driving for one week. Avoid intercourse 4-6 weeks, no douching or tampon use. Check with obstetrician before starting or resuming an exercise program.         Discharge Instructions/Special Treatment/Home Care Needs:     Continue prenatal vitamins. Continue to use squirt bottle with warm water on your episiotomy after each bathroom use until bleeding stops. If steri-strips applied to your incision, remove in 7-10 days. Call your doctor for the following:     Fever over 101 degrees by mouth. Vaginal bleeding heavier than a normal menstrual period or clot larger than a golf ball. Red streaks or increased swelling of legs, painful red streaks on your breast.  Painful urination, constipation and increased pain or swelling or discharge with your incision. If you feel extremely anxious or overwhelmed. If you have thoughts of harming yourself and/or your baby. Pain Management:     Pain Management:   Take Acetaminophen (Tylenol) or Ibuprofen (Advil, Motrin), as directed for pain. Use a warm Sitz bath 3 times daily to relieve episiotomy or hemorrhoidal discomfort. For hemorrhoidal discomfort, use Tucks and Anusol cream as needed and directed.         Follow-Up Care: These are general instructions for a healthy lifestyle:    No smoking/ No tobacco products/ Avoid exposure to second hand smoke    Surgeon General's Warning:  Quitting smoking now greatly reduces serious risk to your health. Obesity, smoking, and sedentary lifestyle greatly increases your risk for illness    A healthy diet, regular physical exercise & weight monitoring are important for maintaining a healthy lifestyle    Recognize signs and symptoms of STROKE:    F-face looks uneven    A-arms unable to move or move unevenly    S-speech slurred or non-existent    T-time-call 911 as soon as signs and symptoms begin-DO NOT go       Back to bed or wait to see if you get better-TIME IS BRAIN.

## 2021-10-07 NOTE — PROGRESS NOTES
1915 Bedside and Verbal shift change report given to JAD Kemp RN (oncoming nurse) by HUSSAIN Santacruz LPN (offgoing nurse). Report included the following information SBAR, Kardex, Intake/Output, MAR and Recent Results.      0710 Bedside and Verbal shift change report given to SCHUYLER Leon RN (oncoming nurse) by Dinorah Woodson RN (offgoing nurse). Report included the following information SBAR, Kardex, Intake/Output, MAR and Recent Results.

## 2021-10-07 NOTE — PROGRESS NOTES
0710 Bedside and verbal shift change report given to Kevinu 57 by Js Cerda RN. Report given with use of SBAR, MAR, I/O, and Recent Results. This RN assumed care of pt at this time. Assessment complete upon assuming care, see flowsheets. Pt denies headache, visual disturbances, ringing in the ears, SOB, chest pain, and/or shooting pain in calves. Pt educated on previously stated signs and symptoms to report, plan of care for the day, proper lalo care, pain management; infant feeding, safety, and I/O log sheet- pt verbalized understanding. Opportunity for questions offered, pt denies questions. This RN rounding Q2 hours. Needs and concerns addressed. Temp Pulse Resp BP SpO2   10/07/21 0810 97.8 °F (36.6 °C) 65 16 103/69 100 %       1245 Motrin administered per order. 1737 Discharge education and interventions complete. Pt discharged to room awaiting transport.

## 2021-10-18 ENCOUNTER — HOSPITAL ENCOUNTER (EMERGENCY)
Dept: ULTRASOUND IMAGING | Age: 41
Discharge: HOME | End: 2021-10-18
Attending: EMERGENCY MEDICINE

## 2021-10-18 ENCOUNTER — HOSPITAL ENCOUNTER (EMERGENCY)
Age: 41
Discharge: HOME | End: 2021-10-18
Attending: STUDENT IN AN ORGANIZED HEALTH CARE EDUCATION/TRAINING PROGRAM

## 2021-10-18 VITALS
RESPIRATION RATE: 18 BRPM | WEIGHT: 160 LBS | DIASTOLIC BLOOD PRESSURE: 88 MMHG | HEART RATE: 97 BPM | SYSTOLIC BLOOD PRESSURE: 155 MMHG | BODY MASS INDEX: 28.34 KG/M2 | OXYGEN SATURATION: 99 % | TEMPERATURE: 98 F

## 2021-10-18 DIAGNOSIS — T14.8XXA HEMATOMA: Primary | ICD-10-CM

## 2021-10-18 LAB
ALBUMIN SERPL-MCNC: 2.9 G/DL (ref 3.4–5)
ALBUMIN/GLOB SERPL: 0.7 {RATIO} (ref 0.8–1.7)
ALP SERPL-CCNC: 128 U/L (ref 45–117)
ALT SERPL-CCNC: 24 U/L (ref 13–56)
ANION GAP SERPL CALC-SCNC: 9 MMOL/L (ref 3–18)
AST SERPL-CCNC: 20 U/L (ref 10–38)
BASOPHILS # BLD: 0 K/UL (ref 0–0.1)
BASOPHILS NFR BLD: 0 % (ref 0–2)
BILIRUB SERPL-MCNC: 0.3 MG/DL (ref 0.2–1)
BUN SERPL-MCNC: 9 MG/DL (ref 7–18)
BUN/CREAT SERPL: 13 (ref 12–20)
CALCIUM SERPL-MCNC: 8.9 MG/DL (ref 8.5–10.1)
CHLORIDE SERPL-SCNC: 112 MMOL/L (ref 100–111)
CO2 SERPL-SCNC: 23 MMOL/L (ref 21–32)
CREAT SERPL-MCNC: 0.67 MG/DL (ref 0.6–1.3)
DIFFERENTIAL METHOD BLD: ABNORMAL
EOSINOPHIL # BLD: 0 K/UL (ref 0–0.4)
EOSINOPHIL NFR BLD: 0 % (ref 0–5)
ERYTHROCYTE [DISTWIDTH] IN BLOOD BY AUTOMATED COUNT: 13.6 % (ref 11.6–14.5)
GLOBULIN SER CALC-MCNC: 4 G/DL (ref 2–4)
GLUCOSE SERPL-MCNC: 92 MG/DL (ref 74–99)
HCT VFR BLD AUTO: 34.6 % (ref 35–45)
HGB BLD-MCNC: 11.6 G/DL (ref 12–16)
INR PPP: 1 (ref 0.8–1.2)
LYMPHOCYTES # BLD: 1.1 K/UL (ref 0.9–3.6)
LYMPHOCYTES NFR BLD: 11 % (ref 21–52)
MCH RBC QN AUTO: 32.4 PG (ref 24–34)
MCHC RBC AUTO-ENTMCNC: 33.5 G/DL (ref 31–37)
MCV RBC AUTO: 96.6 FL (ref 78–100)
MONOCYTES # BLD: 0.3 K/UL (ref 0.05–1.2)
MONOCYTES NFR BLD: 3 % (ref 3–10)
NEUTS SEG # BLD: 8.3 K/UL (ref 1.8–8)
NEUTS SEG NFR BLD: 84 % (ref 40–73)
PLATELET # BLD AUTO: 282 K/UL (ref 135–420)
PMV BLD AUTO: 10.4 FL (ref 9.2–11.8)
POTASSIUM SERPL-SCNC: 3.7 MMOL/L (ref 3.5–5.5)
PROT SERPL-MCNC: 6.9 G/DL (ref 6.4–8.2)
PROTHROMBIN TIME: 12.8 SEC (ref 11.5–15.2)
RBC # BLD AUTO: 3.58 M/UL (ref 4.2–5.3)
SODIUM SERPL-SCNC: 144 MMOL/L (ref 136–145)
WBC # BLD AUTO: 9.8 K/UL (ref 4.6–13.2)

## 2021-10-18 PROCEDURE — 76856 US EXAM PELVIC COMPLETE: CPT

## 2021-10-18 PROCEDURE — 99285 EMERGENCY DEPT VISIT HI MDM: CPT

## 2021-10-18 PROCEDURE — 96360 HYDRATION IV INFUSION INIT: CPT

## 2021-10-18 PROCEDURE — 80053 COMPREHEN METABOLIC PANEL: CPT

## 2021-10-18 PROCEDURE — 85610 PROTHROMBIN TIME: CPT

## 2021-10-18 PROCEDURE — 96361 HYDRATE IV INFUSION ADD-ON: CPT

## 2021-10-18 PROCEDURE — 74011250636 HC RX REV CODE- 250/636: Performed by: EMERGENCY MEDICINE

## 2021-10-18 PROCEDURE — 85025 COMPLETE CBC W/AUTO DIFF WBC: CPT

## 2021-10-18 RX ADMIN — SODIUM CHLORIDE 1000 ML: 900 INJECTION, SOLUTION INTRAVENOUS at 10:57

## 2021-10-18 NOTE — ED PROVIDER NOTES
EMERGENCY DEPARTMENT HISTORY AND PHYSICAL EXAM    Date: 10/18/2021  Patient Name: Rodolfo Avina    History of Presenting Illness     Chief Complaint   Patient presents with    Vaginal Bleeding         History Provided By: Patient, Patient's  and [place] interpretor    Additional History (Context): Rodolfo Avina is a 35 y.o. female with Vaginal delivery on October 6, third vaginal delivery who presents with vaginal bleeding last night. She changed her pad 3 times since bleeding began. Denies any pelvic pain. Denies nausea vomiting or fever. Not nursing. PCP: Unknown, Provider, MD    Current Outpatient Medications   Medication Sig Dispense Refill    ibuprofen (MOTRIN) 800 mg tablet Take 1 Tablet by mouth every eight (8) hours as needed (Pain scale 4-6). 60 Tablet 1    prenatal vit-iron fumarate-fa (PRENATAL PLUS with IRON) 28 mg iron- 800 mcg tab Take 1 Tablet by mouth daily. Past History     Past Medical History:  Past Medical History:   Diagnosis Date    Anemia 10/4/2021    Gestational diabetes        Past Surgical History:  No past surgical history on file. Family History:  No family history on file. Social History:  Social History     Tobacco Use    Smoking status: Never Smoker    Smokeless tobacco: Never Used   Substance Use Topics    Alcohol use: Not on file    Drug use: Not on file       Allergies:  No Known Allergies      Review of Systems   Review of Systems   Constitutional: Negative for fatigue and fever. HENT: Negative. Eyes: Negative. Respiratory: Negative. Cardiovascular: Negative. Gastrointestinal: Negative for nausea and vomiting. Endocrine: Negative. Genitourinary: Positive for vaginal bleeding. Negative for pelvic pain. Musculoskeletal: Negative. Skin: Negative. Allergic/Immunologic: Negative. Neurological: Negative. Hematological: Does not bruise/bleed easily. Psychiatric/Behavioral: Negative.       All Other Systems Negative  Physical Exam     Vitals:    10/18/21 1301 10/18/21 1302 10/18/21 1303 10/18/21 1304   BP:       Pulse:       Resp:       Temp:       SpO2: 99% 99% 99% 99%   Weight:         Physical Exam  Vitals and nursing note reviewed. Constitutional:       Appearance: She is well-developed. HENT:      Head: Normocephalic and atraumatic. Eyes:      Pupils: Pupils are equal, round, and reactive to light. Neck:      Thyroid: No thyromegaly. Vascular: No JVD. Trachea: No tracheal deviation. Cardiovascular:      Rate and Rhythm: Normal rate and regular rhythm. Heart sounds: Normal heart sounds. No murmur heard. No friction rub. No gallop. Pulmonary:      Effort: Pulmonary effort is normal. No respiratory distress. Breath sounds: Normal breath sounds. No stridor. No wheezing or rales. Chest:      Chest wall: No tenderness. Abdominal:      General: There is no distension. Palpations: Abdomen is soft. There is no mass. Tenderness: There is abdominal tenderness. There is no guarding or rebound. Comments: Suprapubic tenderness   Musculoskeletal:         General: No tenderness. Lymphadenopathy:      Cervical: No cervical adenopathy. Skin:     General: Skin is warm and dry. Coloration: Skin is not pale. Findings: No erythema or rash. Neurological:      Mental Status: She is alert and oriented to person, place, and time. Psychiatric:         Behavior: Behavior normal.         Thought Content:  Thought content normal.          Diagnostic Study Results     Labs -     Recent Results (from the past 12 hour(s))   CBC WITH AUTOMATED DIFF    Collection Time: 10/18/21 10:37 AM   Result Value Ref Range    WBC 9.8 4.6 - 13.2 K/uL    RBC 3.58 (L) 4.20 - 5.30 M/uL    HGB 11.6 (L) 12.0 - 16.0 g/dL    HCT 34.6 (L) 35.0 - 45.0 %    MCV 96.6 78.0 - 100.0 FL    MCH 32.4 24.0 - 34.0 PG    MCHC 33.5 31.0 - 37.0 g/dL    RDW 13.6 11.6 - 14.5 %    PLATELET 470 206 - 712 K/uL    MPV 10.4 9.2 - 11.8 FL    NEUTROPHILS 84 (H) 40 - 73 %    LYMPHOCYTES 11 (L) 21 - 52 %    MONOCYTES 3 3 - 10 %    EOSINOPHILS 0 0 - 5 %    BASOPHILS 0 0 - 2 %    ABS. NEUTROPHILS 8.3 (H) 1.8 - 8.0 K/UL    ABS. LYMPHOCYTES 1.1 0.9 - 3.6 K/UL    ABS. MONOCYTES 0.3 0.05 - 1.2 K/UL    ABS. EOSINOPHILS 0.0 0.0 - 0.4 K/UL    ABS. BASOPHILS 0.0 0.0 - 0.1 K/UL    DF AUTOMATED     METABOLIC PANEL, COMPREHENSIVE    Collection Time: 10/18/21 10:37 AM   Result Value Ref Range    Sodium 144 136 - 145 mmol/L    Potassium 3.7 3.5 - 5.5 mmol/L    Chloride 112 (H) 100 - 111 mmol/L    CO2 23 21 - 32 mmol/L    Anion gap 9 3.0 - 18 mmol/L    Glucose 92 74 - 99 mg/dL    BUN 9 7.0 - 18 MG/DL    Creatinine 0.67 0.6 - 1.3 MG/DL    BUN/Creatinine ratio 13 12 - 20      GFR est AA >60 >60 ml/min/1.73m2    GFR est non-AA >60 >60 ml/min/1.73m2    Calcium 8.9 8.5 - 10.1 MG/DL    Bilirubin, total 0.3 0.2 - 1.0 MG/DL    ALT (SGPT) 24 13 - 56 U/L    AST (SGOT) 20 10 - 38 U/L    Alk. phosphatase 128 (H) 45 - 117 U/L    Protein, total 6.9 6.4 - 8.2 g/dL    Albumin 2.9 (L) 3.4 - 5.0 g/dL    Globulin 4.0 2.0 - 4.0 g/dL    A-G Ratio 0.7 (L) 0.8 - 1.7     PROTHROMBIN TIME + INR    Collection Time: 10/18/21 10:37 AM   Result Value Ref Range    Prothrombin time 12.8 11.5 - 15.2 sec    INR 1.0 0.8 - 1.2         Radiologic Studies -   US PELV NON OBS W DOPPLER   Final Result      Complex echogenic endometrial material without internal vascularity, may   represent postpartum hematoma versus retained products of conception. CT Results  (Last 48 hours)    None        CXR Results  (Last 48 hours)    None            Medical Decision Making   I am the first provider for this patient. I reviewed the vital signs, available nursing notes, past medical history, past surgical history, family history and social history. Vital Signs-Reviewed the patient's vital signs.       Records Reviewed: Nursing Notes    Procedures:  Pelvic Exam    Date/Time: 10/18/2021 10:20 AM  Performed by: PA  Procedure duration:  5 minutes. Exam assisted by:  nurse Balbina Pedro. Type of exam performed: speculum and bimanual.    External genitalia appearance: normal.    Vaginal exam:  bleeding. Bleeding: moderate  Cervical exam:  active bleeding from os. Specimen(s) collected:  none. Bimanual exam:  uterine tenderness and right adenexal tenderness. Patient tolerance: patient tolerated the procedure well with no immediate complications          Provider Notes (Medical Decision Making): Patient is not lightheaded or dizzy. Her H&H is stable as well her her vital signs. She has a 5 cm hematoma on ultrasound. Possibility of products of retained of conception retained products discussed with Dr. Jazmyn Carlson but unlikely given lack of leukocytosis pelvic pain fever. Will have patient return for worsening of bleeding or if she becomes symptomatic for lightheadedness or dizziness. Advised to follow-up with Dr. Jazmyn Carlson in a week. MED RECONCILIATION:  No current facility-administered medications for this encounter. Current Outpatient Medications   Medication Sig    ibuprofen (MOTRIN) 800 mg tablet Take 1 Tablet by mouth every eight (8) hours as needed (Pain scale 4-6).  prenatal vit-iron fumarate-fa (PRENATAL PLUS with IRON) 28 mg iron- 800 mcg tab Take 1 Tablet by mouth daily. Disposition:  home    DISCHARGE NOTE:   1:19 PM    Pt has been reexamined. Patient has no new complaints, changes, or physical findings. Care plan outlined and precautions discussed. Results of labs, US were reviewed with the patient. All medications were reviewed with the patient. All of pt's questions and concerns were addressed. Patient was instructed and agrees to follow up with OB, as well as to return to the ED upon further deterioration. Patient is ready to go home.     Follow-up Information     Follow up With Specialties Details Why Contact Info    Patrick Lim MD Obstetrics & Gynecology, Gynecology, Obstetrics Schedule an appointment as soon as possible for a visit in 1 week  [address] [address] [facility] EMERGENCY DEPT Emergency Medicine   [address] [place], [place] [id number] [phone]          Current Discharge Medication List            Diagnosis     Clinical Impression:   1. Hematoma    2.  Postpartum hemorrhage, unspecified type